# Patient Record
Sex: FEMALE | Race: WHITE
[De-identification: names, ages, dates, MRNs, and addresses within clinical notes are randomized per-mention and may not be internally consistent; named-entity substitution may affect disease eponyms.]

---

## 2019-04-02 ENCOUNTER — HOSPITAL ENCOUNTER (OUTPATIENT)
Dept: HOSPITAL 84 - D.HCCARDIO | Age: 84
Discharge: HOME | End: 2019-04-02
Attending: INTERNAL MEDICINE
Payer: MEDICARE

## 2019-04-02 VITALS — BODY MASS INDEX: 35.8 KG/M2

## 2019-04-02 DIAGNOSIS — I25.10: Primary | ICD-10-CM

## 2019-04-18 ENCOUNTER — HOSPITAL ENCOUNTER (OUTPATIENT)
Dept: HOSPITAL 84 - D.CATH | Age: 84
Discharge: HOME | End: 2019-04-18
Attending: INTERNAL MEDICINE
Payer: MEDICARE

## 2019-04-18 ENCOUNTER — HOSPITAL ENCOUNTER (OUTPATIENT)
Dept: HOSPITAL 84 - D.ER | Age: 84
Setting detail: OBSERVATION
LOS: 1 days | Discharge: HOME | End: 2019-04-19
Attending: INTERNAL MEDICINE | Admitting: INTERNAL MEDICINE
Payer: MEDICARE

## 2019-04-18 VITALS
HEIGHT: 63 IN | HEIGHT: 63 IN | BODY MASS INDEX: 35.79 KG/M2 | BODY MASS INDEX: 35.79 KG/M2 | BODY MASS INDEX: 35.79 KG/M2 | WEIGHT: 202 LBS | WEIGHT: 202 LBS

## 2019-04-18 VITALS
HEIGHT: 63 IN | WEIGHT: 202.42 LBS | BODY MASS INDEX: 35.87 KG/M2 | WEIGHT: 202.42 LBS | HEIGHT: 63 IN | BODY MASS INDEX: 35.87 KG/M2

## 2019-04-18 VITALS — DIASTOLIC BLOOD PRESSURE: 75 MMHG | SYSTOLIC BLOOD PRESSURE: 154 MMHG

## 2019-04-18 VITALS — SYSTOLIC BLOOD PRESSURE: 154 MMHG | DIASTOLIC BLOOD PRESSURE: 87 MMHG

## 2019-04-18 VITALS — SYSTOLIC BLOOD PRESSURE: 113 MMHG | DIASTOLIC BLOOD PRESSURE: 65 MMHG

## 2019-04-18 VITALS — DIASTOLIC BLOOD PRESSURE: 66 MMHG | SYSTOLIC BLOOD PRESSURE: 108 MMHG

## 2019-04-18 VITALS — DIASTOLIC BLOOD PRESSURE: 71 MMHG | SYSTOLIC BLOOD PRESSURE: 162 MMHG

## 2019-04-18 DIAGNOSIS — E78.5: ICD-10-CM

## 2019-04-18 DIAGNOSIS — Z95.1: ICD-10-CM

## 2019-04-18 DIAGNOSIS — I25.719: Primary | ICD-10-CM

## 2019-04-18 DIAGNOSIS — E11.9: ICD-10-CM

## 2019-04-18 DIAGNOSIS — I25.119: ICD-10-CM

## 2019-04-18 DIAGNOSIS — I10: ICD-10-CM

## 2019-04-18 DIAGNOSIS — I25.110: Primary | ICD-10-CM

## 2019-04-18 DIAGNOSIS — Z95.5: ICD-10-CM

## 2019-04-18 DIAGNOSIS — Z01.812: ICD-10-CM

## 2019-04-18 LAB
ALBUMIN SERPL-MCNC: 3.6 G/DL (ref 3.4–5)
ALP SERPL-CCNC: 95 U/L (ref 46–116)
ALT SERPL-CCNC: 27 U/L (ref 10–68)
ANION GAP SERPL CALC-SCNC: 17.6 MMOL/L (ref 8–16)
ANION GAP SERPL CALC-SCNC: 17.6 MMOL/L (ref 8–16)
APTT BLD: 23.1 SECONDS (ref 22.8–39.4)
BASOPHILS NFR BLD AUTO: 0 % (ref 0–2)
BASOPHILS NFR BLD AUTO: 0 % (ref 0–2)
BILIRUB SERPL-MCNC: 0.33 MG/DL (ref 0.2–1.3)
BUN SERPL-MCNC: 19 MG/DL (ref 7–18)
BUN SERPL-MCNC: 24 MG/DL (ref 7–18)
CALCIUM SERPL-MCNC: 8.9 MG/DL (ref 8.5–10.1)
CALCIUM SERPL-MCNC: 9.4 MG/DL (ref 8.5–10.1)
CHLORIDE SERPL-SCNC: 100 MMOL/L (ref 98–107)
CHLORIDE SERPL-SCNC: 100 MMOL/L (ref 98–107)
CK MB SERPL-MCNC: 1.9 U/L (ref 0–3.6)
CK SERPL-CCNC: 81 UL (ref 21–215)
CO2 SERPL-SCNC: 23.7 MMOL/L (ref 21–32)
CO2 SERPL-SCNC: 24.7 MMOL/L (ref 21–32)
CREAT SERPL-MCNC: 1.2 MG/DL (ref 0.6–1.3)
CREAT SERPL-MCNC: 1.4 MG/DL (ref 0.6–1.3)
EOSINOPHIL NFR BLD: 0 % (ref 0–7)
EOSINOPHIL NFR BLD: 0.3 % (ref 0–7)
ERYTHROCYTE [DISTWIDTH] IN BLOOD BY AUTOMATED COUNT: 12.9 % (ref 11.5–14.5)
ERYTHROCYTE [DISTWIDTH] IN BLOOD BY AUTOMATED COUNT: 13.2 % (ref 11.5–14.5)
GLOBULIN SER-MCNC: 3.9 G/L
GLUCOSE SERPL-MCNC: 429 MG/DL (ref 74–106)
GLUCOSE SERPL-MCNC: 611 MG/DL (ref 74–106)
HCT VFR BLD CALC: 36.5 % (ref 36–48)
HCT VFR BLD CALC: 38.4 % (ref 36–48)
HGB BLD-MCNC: 12.3 G/DL (ref 12–16)
HGB BLD-MCNC: 12.6 G/DL (ref 12–16)
IMM GRANULOCYTES NFR BLD: 0.5 % (ref 0–5)
IMM GRANULOCYTES NFR BLD: 0.7 % (ref 0–5)
INR PPP: 1.06 (ref 0.85–1.17)
LYMPHOCYTES NFR BLD AUTO: 13.1 % (ref 15–50)
LYMPHOCYTES NFR BLD AUTO: 22.6 % (ref 15–50)
MAGNESIUM SERPL-MCNC: 1.9 MG/DL (ref 1.8–2.4)
MCH RBC QN AUTO: 31.9 PG (ref 26–34)
MCH RBC QN AUTO: 32.2 PG (ref 26–34)
MCHC RBC AUTO-ENTMCNC: 32.8 G/DL (ref 31–37)
MCHC RBC AUTO-ENTMCNC: 33.7 G/DL (ref 31–37)
MCV RBC: 94.8 FL (ref 80–100)
MCV RBC: 98.2 FL (ref 80–100)
MONOCYTES NFR BLD: 2.7 % (ref 2–11)
MONOCYTES NFR BLD: 4.7 % (ref 2–11)
NEUTROPHILS NFR BLD AUTO: 73.9 % (ref 40–80)
NEUTROPHILS NFR BLD AUTO: 81.5 % (ref 40–80)
OSMOLALITY SERPL CALC.SUM OF ELEC: 295 MOSM/KG (ref 275–300)
OSMOLALITY SERPL CALC.SUM OF ELEC: 305 MOSM/KG (ref 275–300)
PLATELET # BLD: 222 10X3/UL (ref 130–400)
PLATELET # BLD: 223 10X3/UL (ref 130–400)
PMV BLD AUTO: 10.3 FL (ref 7.4–10.4)
PMV BLD AUTO: 10.4 FL (ref 7.4–10.4)
POTASSIUM SERPL-SCNC: 4.3 MMOL/L (ref 3.5–5.1)
POTASSIUM SERPL-SCNC: 4.3 MMOL/L (ref 3.5–5.1)
PROT SERPL-MCNC: 7.5 G/DL (ref 6.4–8.2)
PROTHROMBIN TIME: 13.3 SECONDS (ref 11.6–15)
RBC # BLD AUTO: 3.85 10X6/UL (ref 4–5.4)
RBC # BLD AUTO: 3.91 10X6/UL (ref 4–5.4)
SODIUM SERPL-SCNC: 137 MMOL/L (ref 136–145)
SODIUM SERPL-SCNC: 138 MMOL/L (ref 136–145)
TROPONIN I SERPL-MCNC: < 0.017 NG/ML (ref 0–0.06)
WBC # BLD AUTO: 6.3 10X3/UL (ref 4.8–10.8)
WBC # BLD AUTO: 9.1 10X3/UL (ref 4.8–10.8)

## 2019-04-18 NOTE — NUR
6 FR EXOSEAL R/GROIN IS CDI WITH CHEST PAIN DENIED REPOSITIONED TO
HOB UP 30 FOR COMFORT. SANDWICH AND SODA TO BEDSIDE

## 2019-04-18 NOTE — NUR
RESTING COMFORTABLY WITH NO C/O. RIGHT GROIN 6F EXOSEAL CDI, NO
BLEEDING OR HEMATOMA NOTED. NO NEEDS VOICED AT THIS TIME. VSS. CALL
LIGHT WITHIN REACH.

## 2019-04-18 NOTE — HEMODYNAMI
PATIENT:ELEAZAR CONNELLY                                 MEDICAL RECORD: [id number]
: 37                                            LOCATION:D.CAT          
ACCT# [id number]                                       ADMISSION DATE: 19
 
 
 Generatedon:20198:16
Patient name: ELEAZAR CONNELLY Patient #: [id number] Visit #: [id number] SSN: [id number]
4-2494 :
1935 Date of study: 2019
Page: Of
Hemodynamic Procedure Report
****************************
Patient Data
Patient Demographics
Procedure consent was obtained
First Name: ELEAZAR            Gender: Female
Last Name: MARICEL           : 1935
[facility] Initial: KRISHNA        Age: 83 year(s)
Patient #: [id number]       Race: 
Visit #: [id number]
SSN: 
Accession #:
[id number]
Additional ID: [id number]
Contact details
Address: [address] [place] [address]   Phone: [phone]
State: [place]
City: [facility]
Zip code: [address]
Past Medical History
Allergies
Allergen        Reaction        Date         Comments
Reported
Other allergy                   2016   augmentin, Contrast
Other allergy                   2018    IODINATED CONTRAST
DYE, AMOXICILIN,
LATEX
Contrast                        8/15/2018
Natural rubber                  8/15/2018
and latex
Natural rubber                  2019
and latex
Admission
Admission Data
Admission Date: 2019   Admission Time: 6:07
Admit Source: Other
Procedure
Procedure Types
Cath Procedure
PCI Procedure
AMI/SVG/ PTCA or Stent
SVG-BMS/WILLIAMS Initial
Procedure Description
Procedure Date
Procedure Date: 2019
Procedure Start Time: 7:51
Procedure End Time: 8:13
Procedure Staff
Name                            Function
 
Angelo Estevez MD                   Performing Physician
Aris Sarabia RN                  Nurse
Frantz Stahl RT                  Scrub
Arvin Goff RT                     Monitor
Fifi Vo RN                Circulator
Procedure Data
Cath Procedure
Fluoroscopy
Diagnostic fluoroscopy      Total fluoroscopy Time: 4.8
time: 4.8 min               min
Diagnostic fluoroscopy      Total fluoroscopy dose: 616
dose: 616 mGy               mGy
Contrast Material
Contrast Material Type                       Amount (ml)
Isovue 370                                   82
Entry Location
Entry     Primary  Successful  Side  Size  Upsize Upsize Entry    Closure Succes
sful  Closure
Location                             (Fr)  1 (Fr) 2 (Fr) Remarks  Device        
      Remarks
Femoral                        Right 6 Fr                         Exoseal
artery                               Short
Procedure Complications
No complications
Procedure Medications
Medication           Administration Route Dosage
0.9% NaCl            I.V.                 100 ml/hr
Oxygen               etCO2 Nasal cannula  2 l/min
Lidocaine 2%         added to field       20
Heparin Flush Bag    added to field       2 bags
(1000units/500ml NS)
Versed               I.V.                 2 mg
Fentanyl             I.V.                 50 mcg
Versed               I.V.                 1 mg
Fentanyl             I.V.                 25 mcg
Heparin Bolus        I.V.                 9000 units
Plavix               P.O.                 75 mg
Hemodynamics
Rest
Heart Rate: 81 (bpm)
Snapshots
Pre Cath      Intra         NCS           Post Cath
Vital Signs
Time    Heart  Resp   SPO2 etCO2   NIBP (mmHg) Rhythm  Pain    Sedation
Rate   (ipm)  (%)  (mmHg)                      Status  Level
(bpm)
7:38:57 91     16     97   26.7    119/77(98)  NSR     0 (11)  10(A)
, No
pain
7:43:17 85     19     96   29      121/72(105) NSR     0 (11)  10(A)
, No
pain
7:47:35 82     18     99   36.4    100/62(93)  NSR     0 (11)  10(A)
, No
pain
7:52:36 81     16     98   26.7    86/56(75)   NSR     0 (11)  10(A)
, No
pain
7:56:44 83     17     98   36.4    93/62(70)   NSR     0 (11)  9(A)
, No
 
pain
8:00:56 83     16     97   12.6    96/56(72)   NSR     0 (11)  9(A)
, No
pain
8:05:12 82     16     98   35.6    91/53(68)   NSR     0 (11)  9(A)
, No
pain
8:09:26 83     11     96   36.4    86/50(68)   NSR     0 (11)  9(A)
, No
pain
8:13:36 82     23     100  28.2    94/57(74)   NSR     0 (11)  10(A)
, No
pain
Medications
Time    Medication       Route   Dose  Verified Delivered Reason          Notes 
   Effectiveness
by       by
7:38:54 0.9% NaCl        I.V.    100   Angelo     Fifi     used for
ml/hr Herb Vo   procedure
RN
7:39:00 Oxygen           etCO2   2     Angelo     Fifi     used for
Nasal   l/min Herb Vo   procedure
cannula                RN
7:39:06 Lidocaine 2%     added   20ml  Angelo     Angelo      for local
to      vial  Herb Estevez MD  anesthetic
field
7:39:10 Heparin Flush    added   2     Angelo     Angelo      used for
Bag              to      bags  Herb Estevez MD  procedure
(1000units/500ml field
NS)
7:47:28 Versed           I.V.    2 mg  Angelo     Fifi     for sedation
Herb Vo
RN
7:47:39 Fentanyl         I.V.    50    Angelo     Fifi     for sedation
mcg   Herb Vo
RN
7:52:39 Versed           I.V.    1 mg  Angelo     Fifi     for sedation
Herb Vo
RN
7:52:45 Fentanyl         I.V.    25    Angelo     Fifi     for sedation
mcg   Herb Vo
RN
8:00:43 Heparin Bolus    I.V.    9000  Angelo     Fifi     for             verifi
ed
units Herb Vo   anticoagulation with Dr. COLLEEN Estevez
8:12:30 Plavix           P.O.    75 mg Angelo     Fifi     for
Herb Vo   antiplatelet
RN        therapy
Procedure Log
Time     Note
7:24:11  Informed consent obtained and on chart
7:24:14  Admit Source: Other
7:24:39  Diagnostic Cath status Elective
7:26:50  Arvin Suit RT(R) sent for patient. Start room use.
7:27:06  H&P Date Dictated: 2019 Within 30 days and on
chart..
7:32:54  Time tracking: Regular hours (M-F 7:00 - 5:00)
7:32:58  Plan of Care:Hemodynamics will remain stable., Cardiac
rhythm will remain stable., Comfort level will be
 
maintained., Respiratory function will remain
adequate., Patient/ family verbilizes understanding of
procedure., Procedure tolerated without complication.,
Recovers from procedure without complications..
7:33:02  Patient received from Pre/Post Procedure Room to CCL 1
Alert and oriented. Tansferred to table in Supine
position.
7:33:03  Warm blankets applied, and elliott hugger turned on for
patient comfort.
7:33:03  Correct patient and procedure confirmed by team.
7:33:04  ECG and BP/O2 sat monitors applied to patient.
7:33:07  Pre-procedure instructions explained to patient.
7:33:08  Pre-op teaching completed and patient verbalized
understanding.
7:33:09  Family in waiting room.
7:33:10  Patient NPO since Midnight.
7:37:45  Vital chart was started
7:38:54  0.9% NaCl 100 ml/hr I.V. was administered by Fifi Vo RN; used for procedure;
7:39:00  Oxygen 2 l/min etCO2 Nasal cannula was administered by
Fifi Vo RN; used for procedure;
7:39:06  Lidocaine 2% 20ml vial added to field was administered
by Angelo Estevez MD; for local anesthetic;
7:39:10  Heparin Flush Bag (1000units/500ml NS) 2 bags added to
field was administered by Angelo Estevez MD; used for
procedure;
7:41:29  Patient allergic to Natural rubber and latex
7:41:31  Is the patient allergic to Iodine/contrast media? No.
7:41:33  Is patient on blood thinner?Yes
7:41:38  **ACC** The patient was administered the following
blood thiners within the last 24 hours: **ACC**Plavix
7:41:40  Patient diabetic? Yes.
7:41:41  If diabetic: On Metformin? Yes
7:41:45  If on Metformin: Last Dose? 2019
7:41:49  ----Pre-sedation anethsthesia assessment.----
7:41:50  Previous problem with sedation/anesthesia? No ?
7:41:51  Snore? Yes
7:41:53  Sleep apnea? Yes
7:41:54  Deviated septum? No
7:41:56  Opens mouth fully? Yes
7:41:57  Sticks out tongue? Yes
7:43:46  Airway obstruction? Yes sesonal allergys
7:43:51  Dentures? Yes upper in tight
7:43:57  Pre procedure: right dorsailis pedis pulse 1+
Palpable, but thready & weak; easily obliterated
7:44:02  Patient pain scale 0/10 ?.
7:46:42  IV patent on arrival in left antecubital with 0.9%
NaCl at [facility].
7:46:47  Lab results completed and on chart.
7:46:50  Right groin area was prepped with chlora-prep and
draped in sterile fashion
7:46:51  Alarms reviewed by R. N.
7:46:51  Sharps counted by scrub and verified by R.N.
7:46:52  Physician arrived
7:46:53  --------ALL STOP TIME OUT------
7:46:53  Final Timeout: patient, procedure, and site verified
with staff and physician. All members of the team are
in agreement.
7:46:55  Right groin site verified by team.
7:47:02  Maximum allowable Isovue 370 dose 300ml. Physician
 
notified. (300ml for normal creatinines. For patients
with creatinine of 1.7 or higher multiply weight(kg) x
5 divided by creatinine.)
7:47:21  Fire Safety Assessment: A--An alcohol-based skin
anteseptic being used preoperatively., C--Open oxygen
or nitrous oxide is being used., D--An ESU, laser, or
fiber-optic light is being used.
7:47:26  Physical assessment completed. ASA score P 2 - A
patient with mild systemic disease as per Angelo Estevez MD.
7:47:28  Versed 2 mg I.V. was administered by Fifi Vo RN;
for sedation;
7:47:29  Sedation plan: IV Moderate Sedation Medication:Versed,
Fentanyl
7:47:39  Fentanyl 50 mcg I.V. was administered by Fifi Vo
RN; for sedation;
7:48:55  ACIST Syringe (31976) opened to sterile field.
7:48:55  Bag Decanter () opened to sterile field.
7:48:56  Medline Cath Pack ([id number]) opened to sterile field.
7:48:56  DIAGNOSTIC WIRE .035 260cm J wire (346602) opened to
sterile field.
7:48:58  ACIST Hand Control (30344) opened to sterile field.
7:48:58  ACIST Manifold (76839) opened to sterile field.
7:48:59  Tegaderm 4 x 4 (1626W) opened to sterile field.
7:50:15  TUBING High Pressure Extension Tubing (Herb)
([id number]) opened to sterile field.
7:50:17  INFLATOR Merit BasixCompak (LP4174) opened to sterile
field.
7:50:26  BMW 300cm Universal 2 J wire ([id number]) opened to
sterile field.
7:50:31  GUIDE 6FR AR 1.0 catheter ([id number]) opened to sterile
field.
7:50:37  EXOSEAL 6Fr () opened to sterile field.
7:50:38  SHEATH 6FR Troy (DSZ015) opened to sterile field.
7:51:48  Procedure started.
7:51:48  Full Disclosure recording started
7:51:58  Local anesthetic to right femoral artery with
Lidocaine 2% by Angelo Estevez MD.**INITIAL ACCESS ONLY**
7:52:05  A 6 Fr Short sheath was inserted into the Right
Femoral artery
7:52:39  Versed 1 mg I.V. was administered by Fifi Vo RN;
for sedation;
7:52:45  Fentanyl 25 mcg I.V. was administered by Fifi Vo
RN; for sedation;
7:53:27  Zero performed for pressure channel P1
7:54:14  6 Fr AR 1 guide catheter was inserted over the wire
7:54:21  Baseline sample Acquired.
7:54:24  Rhythm: sinus rhythm
7:56:58  Guide Catheter removed. unable to cannulate vessel.
7:57:37  GUIDE 6FR LCB catheter (LA6LCB) opened to sterile
field.
7:57:45  6 Fr LCB guide catheter was inserted over the wire
8:00:03  Guide Catheter removed. unable to cannulate vessel.
8:00:13  GUIDE 6FR 3DRC catheter ([id number]) opened to sterile
field.
8:00:43  Heparin Bolus 9000 units I.V. was administered by
Fifi Vo RN; for anticoagulation; verified with
Dr. Estevez
8:00:57  BMW2 wire advanced.
8:10:01  Place stent Inflation Number: 1 A SEGUNDO OTW 3.5 x 34
 
stent ([id number]) was prepped and advanced across
the Aorta Left -> Mid CX. The stent was deployed at 13
MALACHI for 0:36 (min:sec).
8:10:33  Stent catheter was removed intact over wire.
8:10:34  Wire removed.
8:10:35  Guide catheter removed.
8:10:51  EXOSEAL 6Fr () opened to sterile field.
8:11:02  Sheath removed intact; hemostasis achieved with
Exoseal to the Right Femoral artery.
8:11:05  Procedure ended.(Physican Out)
8:11:21  Fluoroscopy time 04.80 minutes.
8:11:27  Flurop Dose total: 616
8:11:27  Fluoroscopy dose: 616 mGy
8:11:32  Contrast amount:Isovue 370 82ml.
8:11:34  Sharps counted by scrub and verified by R.N.
8:11:35  Insertion/operative site no bleeding no hematoma.
8:11:38  Post-op/insertion site Right Femoral artery dressed
using a 4 x 4 and Tegaderm.
8:11:42  Post right femoral artery:stable
8:11:51  Post Procedure Pulses reassessed and unchanged
8:11:54  Post procedure: right dorsailis pedis pulse 2+ Normal;
easily identifiable; not easily obliterated.
8:12:30  Plavix 75 mg P.O. was administered by Fifi Vo RN; for antiplatelet therapy;
8:12:32  Post procedure rhythm: sinus rhythm
8:12:34  Post procedure instruction explained to
patient.Patient verbalizes understanding.
8:12:35  Procedure and supply charges have been captured,
reviewed, submitted and are correct.
8:12:58  Procedure Complication : No complications
8:13:15  Vital chart was stopped
8:13:15  See physician's report for complete and final results.
8:13:17  Report given to Pre/Post Procedure Room.
8:13:21  Patient transfered to Pre/Post Procedure Room with
Stretcher.
8:13:24  Procedure ended.
8:13:24  Full Disclosure recording stopped
8:13:27  End room use (Document Last)
Intervention Summary
Intervention Notes
Time    ActionType  Lesion and  Equipment     Action#  Pressure  Duration
Attributes  Used
8:10:01 Place stent Aorta Left  SEGUNDO OTW 3.5  1        13        00:36
-> Mid CX   x 34 stent
([id number])
Device Usage
Item Name     Manufacture  Quantity  Catalog      Hospital Part     Current Mini
mal Lot# /
Number       Charge   Number   Stock   Stock   Serial#
Code
ACIST Syringe Acist        1         31838        029339   256858   258632  20
(49771)       Medical
Systems Inc
Bag Decanter  Microtek     1                 979934   21662    508060  5
()       Medical Inc.
Medline Cath  Medline      1         [id number]    699537   27322    902735  5
Pack
([id number])
DIAGNOSTIC    [place]      1         664542       553989   334385   736857  30
WIRE .035
 
260cm J wire
(553253)
ACIST Hand    Acist        1         74689        448770   411208   952862  5
Control       Medical
(53176)       Systems Inc
ACIST         Acist        1         74847        774119   825802   873444  5
Manifold      Medical
(39688)       Systems Inc
Tegaderm 4 x  3M           1         1626W        596473   200219   470301  5
4 (1626W)
TUBING High   Merit        1         [id number]      288821   17596    265149  10
Pressure      Medical
Extension
Tubing
(Estevez)
([id number])
INFLATOR      Merit        1         ON3343       953082   344957   944225  15
Merit         Medical
BasixCompak
(VV0338)
BMW 300cm     Abbott       1         [id number]     234172   764723   620449  5
[place] 2 J Vascular
wire
([id number])
GUIDE 6FR AR  Medtronic    1         SS7NA30      210235   05679    096521  1
1.0 catheter
(CT0PS45)
EXOSEAL 6Fr   Cardinal     2                 465914   291933   957100  10
()       Health
SHEATH 6FR    Terumo       1         AAM479       587391   654058   453629  40
Troy
(FQR991)
GUIDE 6FR LCB Medtronic    1         LA6LCB       963689   98562    107829  1
catheter
(LA6LCB)
GUIDE 6FR     Medtronic    1         TT87INE      294385   877036   913477  1
3DRC catheter
(PT59IGD)
SEGUNDO OTW 3.5  Medtronic    1         [id number]  758192   4321107  432285  5   
    [id number]
x 34 stent
([id number])
Signature Audit [place]
Stage           Time        Signature      Unsigned
Intra-Procedure 2019   Arvin Goff RT(SIMA)
8:16:07 AM
Signatures
Monitor : Arvin Goff RT    Signature :
______________________________
Date : ______________ Time :
______________
 
 
 
 
[facility]                                          
[address] [address]

## 2019-04-18 NOTE — NUR
6 FR EXOSEAL R/GROIN REMAINS CDI WITH NO BLEEDING OR HEMATOMA SITE IS
SOFT TO TOUCH WITH R/FOOT WARM

## 2019-04-18 NOTE — NUR
PATIENT CONTINUES TO REST WITH EYES CLOSED. RESPIRATONS ARE EVEN AND
UNLABORED. 6 FR EXOSEAL R/GROIN IS CDI NO BLEEDING NOTED

## 2019-04-18 NOTE — NUR
RECIEVED TO ROOM VIA STRETCHER FROM CATH LAB WITH 6 FR EXOSEAL
R/GROIN CDI NO BLEEDING OR HEMATOMA NOTED. PATIENT CONNECTED TO
MONITOR FOR OBSERVATION WITH HR 83 /67. CHEST PAIN IS DENIED

## 2019-04-18 NOTE — NUR
VERBAL AND WRITTEN DISCHARGE GONE OVER WITH PATIENT. 6 FR EXOSEAL
R/GROIN IS CDI NO BLEEDING OR HEMATOMA. PIV REMOVED WITH DRESSING
APPLIED. PATIENT UP TO GET DRESSED FOR DISCHARGE HOME

## 2019-04-19 VITALS — SYSTOLIC BLOOD PRESSURE: 108 MMHG | DIASTOLIC BLOOD PRESSURE: 65 MMHG

## 2019-04-19 VITALS — DIASTOLIC BLOOD PRESSURE: 72 MMHG | SYSTOLIC BLOOD PRESSURE: 132 MMHG

## 2019-04-19 VITALS — DIASTOLIC BLOOD PRESSURE: 77 MMHG | SYSTOLIC BLOOD PRESSURE: 121 MMHG

## 2019-04-19 LAB
CK MB SERPL-MCNC: 2.2 U/L (ref 0–3.6)
CK MB SERPL-MCNC: 2.7 U/L (ref 0–3.6)
CK SERPL-CCNC: 74 UL (ref 21–215)
CK SERPL-CCNC: 91 UL (ref 21–215)
TROPONIN I SERPL-MCNC: 0.28 NG/ML (ref 0–0.06)
TROPONIN I SERPL-MCNC: 0.3 NG/ML (ref 0–0.06)

## 2020-02-25 ENCOUNTER — HOSPITAL ENCOUNTER (OUTPATIENT)
Dept: HOSPITAL 84 - D.CATH | Age: 85
Discharge: HOME | End: 2020-02-25
Attending: INTERNAL MEDICINE
Payer: MEDICARE

## 2020-02-25 ENCOUNTER — HOSPITAL ENCOUNTER (INPATIENT)
Dept: HOSPITAL 84 - D.ER | Age: 85
LOS: 1 days | Discharge: HOME | DRG: 303 | End: 2020-02-26
Attending: INTERNAL MEDICINE | Admitting: INTERNAL MEDICINE
Payer: MEDICARE

## 2020-02-25 VITALS — BODY MASS INDEX: 36.04 KG/M2 | BODY MASS INDEX: 36.04 KG/M2 | HEIGHT: 63 IN | WEIGHT: 203.43 LBS

## 2020-02-25 VITALS — DIASTOLIC BLOOD PRESSURE: 65 MMHG | SYSTOLIC BLOOD PRESSURE: 151 MMHG

## 2020-02-25 VITALS — DIASTOLIC BLOOD PRESSURE: 64 MMHG | SYSTOLIC BLOOD PRESSURE: 134 MMHG

## 2020-02-25 VITALS — WEIGHT: 209.04 LBS | HEIGHT: 63 IN | BODY MASS INDEX: 37.04 KG/M2

## 2020-02-25 VITALS — DIASTOLIC BLOOD PRESSURE: 72 MMHG | SYSTOLIC BLOOD PRESSURE: 142 MMHG

## 2020-02-25 DIAGNOSIS — K21.9: ICD-10-CM

## 2020-02-25 DIAGNOSIS — N17.9: ICD-10-CM

## 2020-02-25 DIAGNOSIS — I10: ICD-10-CM

## 2020-02-25 DIAGNOSIS — E78.5: ICD-10-CM

## 2020-02-25 DIAGNOSIS — R53.83: ICD-10-CM

## 2020-02-25 DIAGNOSIS — R94.30: ICD-10-CM

## 2020-02-25 DIAGNOSIS — R06.09: ICD-10-CM

## 2020-02-25 DIAGNOSIS — I25.119: Primary | ICD-10-CM

## 2020-02-25 DIAGNOSIS — E11.9: ICD-10-CM

## 2020-02-25 DIAGNOSIS — E11.40: ICD-10-CM

## 2020-02-25 DIAGNOSIS — Z79.84: ICD-10-CM

## 2020-02-25 LAB
ALBUMIN SERPL-MCNC: 3.4 G/DL (ref 3.4–5)
ALP SERPL-CCNC: 81 U/L (ref 30–120)
ALT SERPL-CCNC: 23 U/L (ref 10–68)
ALT SERPL-CCNC: 27 U/L (ref 10–68)
ANION GAP SERPL CALC-SCNC: 16 MMOL/L (ref 8–16)
ANION GAP SERPL CALC-SCNC: 16.5 MMOL/L (ref 8–16)
BASOPHILS NFR BLD AUTO: 0.1 % (ref 0–2)
BASOPHILS NFR BLD AUTO: 0.1 % (ref 0–2)
BILIRUB SERPL-MCNC: 0.4 MG/DL (ref 0.2–1.3)
BILIRUB SERPL-MCNC: NEGATIVE MG/DL
BUN SERPL-MCNC: 26 MG/DL (ref 7–18)
BUN SERPL-MCNC: 28 MG/DL (ref 7–18)
CALCIUM SERPL-MCNC: 9.1 MG/DL (ref 8.5–10.1)
CALCIUM SERPL-MCNC: 9.3 MG/DL (ref 8.5–10.1)
CHLORIDE SERPL-SCNC: 100 MMOL/L (ref 98–107)
CHLORIDE SERPL-SCNC: 100 MMOL/L (ref 98–107)
CHOLEST/HDLC SERPL: 2.3 RATIO (ref 2.3–4.1)
CK MB SERPL-MCNC: 1.5 U/L (ref 0–3.6)
CK SERPL-CCNC: 103 UL (ref 21–215)
CO2 SERPL-SCNC: 23.9 MMOL/L (ref 21–32)
CO2 SERPL-SCNC: 28.7 MMOL/L (ref 21–32)
CREAT SERPL-MCNC: 1.4 MG/DL (ref 0.6–1.3)
CREAT SERPL-MCNC: 1.6 MG/DL (ref 0.6–1.3)
EOSINOPHIL NFR BLD: 0 % (ref 0–7)
EOSINOPHIL NFR BLD: 0.1 % (ref 0–7)
ERYTHROCYTE [DISTWIDTH] IN BLOOD BY AUTOMATED COUNT: 13.1 % (ref 11.5–14.5)
ERYTHROCYTE [DISTWIDTH] IN BLOOD BY AUTOMATED COUNT: 13.2 % (ref 11.5–14.5)
GLOBULIN SER-MCNC: 3.9 G/L
GLUCOSE SERPL-MCNC: 1000 MG/DL
GLUCOSE SERPL-MCNC: 389 MG/DL (ref 74–106)
GLUCOSE SERPL-MCNC: 599 MG/DL (ref 74–106)
HCT VFR BLD CALC: 36.1 % (ref 36–48)
HCT VFR BLD CALC: 37.5 % (ref 36–48)
HDLC SERPL-MCNC: 76 MG/DL (ref 32–96)
HGB BLD-MCNC: 11.9 G/DL (ref 12–16)
HGB BLD-MCNC: 12.1 G/DL (ref 12–16)
IMM GRANULOCYTES NFR BLD: 0.5 % (ref 0–5)
IMM GRANULOCYTES NFR BLD: 0.7 % (ref 0–5)
KETONES UR STRIP-MCNC: NEGATIVE MG/DL
LDL-HDL RATIO: 1.2 RATIO (ref 1.5–3.5)
LDLC SERPL-MCNC: 91 MG/DL (ref 0–100)
LYMPHOCYTES NFR BLD AUTO: 10.1 % (ref 15–50)
LYMPHOCYTES NFR BLD AUTO: 13.1 % (ref 15–50)
MAGNESIUM SERPL-MCNC: 1.6 MG/DL (ref 1.8–2.4)
MCH RBC QN AUTO: 31.5 PG (ref 26–34)
MCH RBC QN AUTO: 31.6 PG (ref 26–34)
MCHC RBC AUTO-ENTMCNC: 32.3 G/DL (ref 31–37)
MCHC RBC AUTO-ENTMCNC: 33 G/DL (ref 31–37)
MCV RBC: 95.5 FL (ref 80–100)
MCV RBC: 97.9 FL (ref 80–100)
MONOCYTES NFR BLD: 2.1 % (ref 2–11)
MONOCYTES NFR BLD: 3.1 % (ref 2–11)
NEUTROPHILS NFR BLD AUTO: 84.1 % (ref 40–80)
NEUTROPHILS NFR BLD AUTO: 86 % (ref 40–80)
NITRITE UR-MCNC: NEGATIVE MG/ML
OSMOLALITY SERPL CALC.SUM OF ELEC: 299 MOSM/KG (ref 275–300)
OSMOLALITY SERPL CALC.SUM OF ELEC: 305 MOSM/KG (ref 275–300)
PH UR STRIP: 5 [PH] (ref 5–6)
PLATELET # BLD: 204 10X3/UL (ref 130–400)
PLATELET # BLD: 218 10X3/UL (ref 130–400)
PMV BLD AUTO: 10.5 FL (ref 7.4–10.4)
PMV BLD AUTO: 10.6 FL (ref 7.4–10.4)
POTASSIUM SERPL-SCNC: 4.4 MMOL/L (ref 3.5–5.1)
POTASSIUM SERPL-SCNC: 4.7 MMOL/L (ref 3.5–5.1)
PROT SERPL-MCNC: 7.3 G/DL (ref 6.4–8.2)
RBC # BLD AUTO: 3.78 10X6/UL (ref 4–5.4)
RBC # BLD AUTO: 3.83 10X6/UL (ref 4–5.4)
SODIUM SERPL-SCNC: 136 MMOL/L (ref 136–145)
SODIUM SERPL-SCNC: 140 MMOL/L (ref 136–145)
SP GR UR STRIP: 1.01 (ref 1–1.02)
TRIGL SERPL-MCNC: 58 MG/DL (ref 30–200)
TROPONIN I SERPL-MCNC: < 0.017 NG/ML (ref 0–0.06)
UROBILINOGEN UR-MCNC: NORMAL MG/DL
WBC # BLD AUTO: 8 10X3/UL (ref 4.8–10.8)
WBC # BLD AUTO: 9.5 10X3/UL (ref 4.8–10.8)

## 2020-02-25 NOTE — NUR
REC TO ROOM 6 VIA STRETCHER FROM CATH LAB. MONITORING INITIATED. DTR
AT BEDSIDE. R GROIN DRESSING TEGADERM, 4X4 CDI. NO S/S BLEEDING OR
HEMATOMA. PPP. WARM BLANKETS PROVIDED. HR NSR 90, /63, SAT 65%
RA

## 2020-02-25 NOTE — NUR
R GROIN CDI, SOFT. NO S/S BLEEDING OR HEMATOMA. RAISED HOB TO PT
COMFORT, PROVIDED SANDWICH AND SODA. NSR 99, /60

## 2020-02-25 NOTE — NUR
DC INSTRUCTIONS REVIEWED. PT TO RESTROOM VIA WHEELCHAIR, VOIDED
WITHOUT DIFFICULTY. DC HOME VIA WHEELCHAIR TO PRIVATE CAR WITH DTR,
PT HAS ALL BELONGINGS.

## 2020-02-25 NOTE — NUR
R GROIN SOFT, NO S/S BLEEDING OR HEMATOMA. PT LIA BURGER AND FRIES
FROM SONIC DTR BROUGHT IN. OFF BEDPAN, VOIDED 300ML MED YELLOW
URINE. ASSISTED TO SITTING ON BEDSIDE POSITION PER PT REQUEST, WANTS
TO WAIT A BIT BEFORE HAVING IV OUT AND DRESSING.

## 2020-02-25 NOTE — NUR
R GROIN SOFT, NO S/S BLEEDING OR HEMATOMA. PPP. TOLERATING SIPS OF
WATER WITH ASSISTANCE. REMINDED TO KEEP HEAD ON PILLOW AND RLE STILL
AND STRAIGHT. DTR AT BEDSIDE.

## 2020-02-25 NOTE — NUR
PT RESTING IN SUPINE POSITION, DAUGHTER LEFT TO GET PT'S C-PAP MACHINE FOR
STAY. PT PROVIDED WITH PILLOW AND BLANKET PER REQUEST. NO SIGNS DISTRESS NOTED.
WILL CONTINUE TO MONITOR.

## 2020-02-25 NOTE — HEMODYNAMI
PATIENT:ELEAZAR CONNELLY                                 MEDICAL RECORD: [id number]
: 37                                            LOCATION:D.CAT          
ACCT# [id number]                                       ADMISSION DATE: 20
 
 
 Generatedon:202013:39
Patient name: ELEAZAR CONNELLY Patient #: [id number] Visit #: [id number] SSN: [id number]
[id number] :
1935 Date of study: 2020
Page: Of
Hemodynamic Procedure Report
****************************
Patient Data
Patient Demographics
Procedure consent was obtained
First Name: ELEAZAR            Gender: Female
Last Name: MARICEL           : 1935
Middle Initial: KRISHNA        Age: 84 year(s)
Patient #: [id number]       Race: 
Visit #: [id number]
SSN: 
Accession #:
[id number]
Additional ID: [id number]
Contact details
Address: [address] [place] [address]   Phone: [phone]
State: [place]
City: [facility]
Zip code: [address]
Past Medical History
Allergies
Allergen        Reaction        Date         Comments
Reported
Other allergy                   2016   augmentin, Contrast
Other allergy                   2018    IODINATED CONTRAST
DYE, AMOXICILIN,
LATEX
Contrast                        8/15/2018
Natural rubber                  8/15/2018
and latex
Natural rubber                  2019
and latex
Other allergy                   2020    IV CONTRAST, LATEX,
BYESTTA, AUGMENTIN,
POWDER
Admission
Admission Data
Admission Date: 2020   Admission Time: 10:06
Arrival Date: 2020     Arrival Time: 0:00
Admit Source: Other         Insurance Payor: Medicare
[facility] #: [id number]
Height (in.): 62.99         BSA: 1.97 (m2)
Height (cm.): 160           BMI: 37.11 (kg/m2)
Weight (lbs.): 209.44
Weight (kg.): 95
Lab Results
Lab Result Date: 2020  Lab Result Time: 0:00
Biochemistry
Name         Units    Result                Min      Max
 
BUN          mg/dl    26       --(----)-*   7        18
Creatinine   mg/dl    1.4      --(----)*-   0.6      1.3
eGFR         ml/min   38       *-(----)--   90       120
NONAFRICAN
CBC
Name         Units    Result                Min      Max
Hematocrit   %        36.1     *-(----)--   42       54
Hemoglobin   g/dl     11.9     *-(----)--   13.5     17.5
Procedure
Procedure Types
Cath Procedure
Diagnostic Procedure
LHC
LHC w/Coronaries w/Grafts
Sedation Charges
Moderate Sedation up to 30 minutes
Procedure Description
Procedure Date
Procedure Date: 2020
Procedure Start Time: 12:55
Procedure End Time: 13:37
Procedure Staff
Name                            Function
Angelo Estevez MD                   Performing Physician
Aris Sarabia RN                  Nurse
Jasmyn Hdez RT               Monitor
Damari Blanco RT              Scrub
Fifi Vo RN                Monitor
Procedure Data
Cath Procedure
Fluoroscopy
Diagnostic fluoroscopy      Total fluoroscopy Time: 8.1
time: 8.1 min               min
Diagnostic fluoroscopy      Total fluoroscopy dose:
dose: 502.19 mGy            502.19 mGy
Contrast Material
Contrast Material Type                       Amount (ml)
Isovue 300                                   76
Entry Location
Entry     Primary  Successful  Side  Size  Upsize Upsize Entry    Closure     Ng
ccessful  Closure
Location                             (Fr)  1 (Fr) 2 (Fr) Remarks  Device        
          Remarks
Femoral                        Right 5 Fr                         Manual
vein                                                              Compression
Femoral                        Right 5 Fr                         Exoseal
artery
Estimated blood loss: 5 ml
Diagnostic catheters
Device Type               Used For           End Catheter
Placement
MULTIPACK JL 4.0 5Fr      Procedure
catheter
DIAGNOSTIC RCB 5Fr        Procedure
catheter (243303F)
DIAGNOSTIC AR MOD 5Fr     Procedure
Catheter (679956E)
DIAGNOSTIC IM 5Fr         Procedure
catheter (607220A)
MULTIPACK Pigtail 5 Fr    Procedure
 
catheter
Procedure Complications
No complications
Procedure Medications
Medication           Administration Route Dosage
Oxygen               NC                   2 l/min
Lidocaine 2%         added to field       20
Heparin Flush Bag    added to field       2 bags
(1000units/500ml NS)
0.9% NaCl            I.V.                 100 ml/hr
Versed               I.V.                 1 mg
Fentanyl             I.V.                 50 mcg
Versed               I.V.                 1 mg
Fentanyl             I.V.                 50 mcg
Versed               I.V.                 0.5 mg
Hemodynamics
Rest
BSA: 1.97 (m2) HGB: 11.9 (g/dl) O2 Consumption: Estimated: 190.14 (ml/min) O2 Co
nsumption indexed:
Estimated:96.52 (ml/min/m) Heart Rate: 89 (bpm)
Pressure Samples
Time     Site     Value (mmHg) Purpose      Heart      Use
Rate(bpm)
13:24    LV       126/4,13     Snapshot     89
13:25    AO       121/51(81)   Pullback     89
13:25    LV       126/2,12     Pullback     89
Gradients
Valve  Time  Site 1   Site 2     Mean    SEP/DFP    Peak To Heart  Use
(mmHg)  (sec/min)  Peak    Rate
(mmHg)  (bpm)
Aortic 13:25 LV       AO         9       26         5       89
126/2,12 121/51(81)
Calculations
Valve    P-P      Mean      Valve     Index  Valve    Source
Name              Gradient  Area             Flow
(cm2)
Aortic   5        9
5        9
Snapshots
Pre Cath      Intra         NCS           Post Cath
Vital Signs
Time     Heart  Resp   SPO2 etCO2   NIBP (mmHg) Rhythm  Pain      Sedation
Rate   (ipm)  (%)  (mmHg)                      Status    Level
(bpm)
12:39:23 89     25     100  35.1    138/79(113) NSR     (Missing) 10(A)
12:43:47 88     10     97   32.8    131/69(108) NSR     (Missing) 10(A)
12:48:11 88     18     97   23.1    130/67(96)  NSR     (Missing) 10(A)
12:52:32 87     11     97   32.1    128/68(98)  NSR     (Missing) 10(A)
12:56:54 86     15     98   37.3    125/65(96)  NSR     (Missing) 9(A)
13:01:10 87     19     97   38.1    125/67(84)  NSR     (Missing) 9(A)
13:05:26 84     22     98   27.6    125/67(98)  NSR     (Missing) 9(A)
13:09:46 85     21     97   37.3    122/66(90)  NSR     (Missing) 9(A)
13:14:06 88     17     97   37.3    117/64(96)  NSR     (Missing) 9(A)
13:18:22 90     21     97   17.1    120/63(91)  NSR     (Missing) 9(A)
13:22:40 88     15     98   35.1    120/60(86)  NSR     (Missing) 9(A)
13:26:56 88     17     97   35.8    117/62(89)  NSR     (Missing) 10(A)
13:31:14 87     13     97   35.8    123/63(89)  NSR     (Missing) 10(A)
13:35:32 87     11     97   37.3    121/63(87)  NSR     (Missing) 10(A)
Medications
Time     Medication       Route  Dose  Verified Delivered Reason     Notes  Effe
 
ctiveness
by       by
12:48:09 Oxygen           NC     2     Angelo     Buffie    used for
l/min Herb Sarabia RN   procedure
12:48:18 Lidocaine 2%     added  20ml  Angelo     Angelo      for local
to     vial  Herb Estevez MD  anesthetic
field
12:48:24 Heparin Flush    added  2     Angelo     Angelo      used for
Bag              to     bags  Herb Estevez MD  procedure
(1000units/500ml field
NS)
12:48:34 0.9% NaCl        I.V.   100   Angelo     Buffie    Per
ml/hr Herb Sarabia RN   physician
12:53:59 Versed           I.V.   1 mg  Angelo     Buffie    for
Herb Sarabia RN   sedation
12:54:04 Fentanyl         I.V.   50    Angelo     Buffie    for
mcg   Herb Sarabia RN   sedation
12:57:13 Versed           I.V.   1 mg  Angelo     Buffie    for
Herb Sarabia RN   sedation
12:57:17 Fentanyl         I.V.   50    Angelo     Buffie    for
mcg   Herb Sarabia RN   sedation
13:12:04 Versed           I.V.   0.5   Angelo     Buffie    for
mg    Herb Sarabia RN   sedation
Procedure Log
Time     Note
12:22:57 Informed consent obtained and on chart
12:25:04 Aris Sarabia RN sent for patient. Start room use.
12:28:29 Lab Result : eGFR NONAFRICAN 38 ml/min
12:28:29 Lab Result : Creatinine 1.4 mg/dl
12:28:29 Lab Result : Hematocrit 36.1 %
12:28:29 Lab Result : BUN 26 mg/dl
12:28:29 Lab Result : Hemoglobin 11.9 g/dl
12:28:53 Arrival Date: 2020 12:00:00 AM
12:29:22 Insurance Payor : Medicare
12:29:33 Patient Height : 62.99 inches
12:29:40 Patient Weight : 209.44 lbs
12:29:49 Admit Source: Other
12:31:00 Procedure Status Elective Heart Cath (OP).
12:31:07 Time tracking: Regular hours (M-F 7:00 - 5:00)
12:31:13 Plan of Care:Hemodynamics will remain stable., Cardiac
rhythm will remain stable., Comfort level will be
maintained., Respiratory function will remain
adequate., Patient/ family verbilizes understanding of
procedure., Procedure tolerated without complication.,
Recovers from procedure without complications..
12:31:25 Patient received from Pre/Post Procedure Room to CCL 3
Alert and oriented. Tansferred to table in Supine
position.
12:37:48 Warm blankets applied, and elliott hugger turned on for
patient comfort.
12:37:48 Correct patient and procedure confirmed by team.
12:37:48 ECG and BP/O2 sat monitors applied to patient.
12:38:04 Vital chart was started
12:38:10 Rhythm: sinus rhythm
12:38:11 Full Disclosure recording started
12:38:17 H&P Date Dictated: 2/3/2020 Within 30 days and on
chart., H&P Addendum completed by physician on day of
procedure. (MUST COMPLETE FOR ALL OUTPATIENTS).
12:38:18 Pre-procedure instructions explained to patient.
12:38:18 Pre-op teaching completed and patient verbalized
 
understanding.
12:38:19 Family in patients room.
12:38:20 Patient NPO since Midnight.
12:38:46 Patient allergic to Other allergyIV CONTRAST, LATEX,
BYESTTA, AUGMENTIN, POWDER
12:38:50 Is the patient allergic to Iodine/contrast media? Yes.
12:38:54 Was the patient premedicated? Yes
12:39:02 Is patient on blood thinner?Yes
12:39:04 **ACC** The patient was administered the following
blood thiners within the last 24 hours: **ACC**Plavix
12:39:05 Patient diabetic? Yes.
12:39:08 If diabetic: On Metformin? Yes
12:39:10 If on Metformin: Last Dose? 2020
12:39:14 Patient not pregnant. Patient is over age 55.
12:39:17 Previous problem with sedation/anesthesia? No ?
12:39:17 Snore? Yes
12:39:18 Sleep apnea? Yes
12:39:19 Deviated septum? No
12:39:20 Opens mouth fully? Yes
12:39:21 Sticks out tongue? Yes
12:39:25 Airway obstruction? Yes ASTHMA
12:39:28 Dentures? No ?
12:39:30 Pre procedure: right dorsailis pedis pulse 1+
Palpable, but thready & weak; easily obliterated
12:39:33 Patient pain scale 0/10 ?.
12:39:37 IV patent on arrival in right antecubital with 0.9%
NaCl at [facility].
12:39:39 Lab results completed and on chart.
12:39:58 Stress Test: yes; abnormal INFERIOR
12:40:01 Right groin area was prepped with chlora-prep and
draped in sterile fashion
12:40:02 Alarms reviewed by R. N.
12:40:02 Sharps counted by scrub and verified by R.N.
12:43:11 Procedure type changed to Cath procedure, Diagnostic
procedure, LHC, LHC w/Coronaries w/Grafts, Sedation
Charges, Moderate Sedation up to 30 minutes
12:45:29 Risk of Mortality: 1.1
12:45:32 Risk of blood transfusion: 3.6
12:45:38 Risk of YARON: 5.1
12:45:43 Use device set Femoral Dx
12:45:44 ACIST Syringe (25658) opened to sterile field.
12:45:44 Bag Decanter (2002S) opened to sterile field.
12:45:45 ACIST Hand Control (72373) opened to sterile field.
12:45:45 ACIST Manifold (18563) opened to sterile field.
12:45:46 Tegaderm 4 x 4 (1626W) opened to sterile field.
12:45:47 Medline Cath Pack ([id number]) opened to sterile field.
12:45:48 DIAGNOSTIC Multipack 5Fr catheter set (OC2937) opened
to sterile field.
12:45:49 SHEATH 5FR [place] (XLU562) opened to sterile field.
12:45:50 EMERALD Guide Wire (540-227) opened to sterile field.
12:48:09 Oxygen 2 l/min NC was administered by Aris Sarabia RN;
used for procedure; Verbal order read back and
verified.
12:48:18 Lidocaine 2% 20ml vial added to field was administered
by Angelo Estevez MD; for local anesthetic; Verbal order
read back and verified.
12:48:24 Heparin Flush Bag (1000units/500ml NS) 2 bags added to
field was administered by Angelo Estevez MD; used for
procedure; Verbal order read back and verified.
12:48:34 0.9% NaCl 100 ml/hr I.V. was administered by Buffie
 
Sarabia RN; Per physician; Verbal order read back and
verified.
12:51:40 --------ALL STOP TIME OUT------
12:51:41 Final Timeout: patient, procedure, and site verified
with staff and physician. All members of the team are
in agreement.
12:51:45 Right groin site verified by team.
12:51:49 Fire Safety Assessment: A--An alcohol-based skin
anteseptic being used preoperatively., C--Open oxygen
or nitrous oxide is being used., D--An ESU, laser, or
fiber-optic light is being used.
12:51:54 Physical assessment completed. ASA score P 2 - A
patient with mild systemic disease as per Angelo Estevez MD.
12:52:02 3b) 30-44 Moderately reduced kidney function.
12:52:10 Maximum allowable contrast dose (3.7 X eGFR X 0.75)105
ml.
12:52:14 Sedation plan: IV Moderate Sedation Medication:Versed,
Fentanyl
12:53:59 Versed 1 mg I.V. was administered by Aris Sarabia RN;
for sedation; Verbal order read back and verified.
12:54:04 Fentanyl 50 mcg I.V. was administered by Aris Sarabia
RN; for sedation; Verbal order read back and verified.
12:54:31 Procedure started.
12:55:01 Local anesthetic to right femoral artery with
Lidocaine 2% by Angelo Estevez MD.**INITIAL ACCESS ONLY**
12:57:13 Versed 1 mg I.V. was administered by Aris Sarabia RN;
for sedation; Verbal order read back and verified.
12:57:17 Fentanyl 50 mcg I.V. was administered by Aris Sarabia
RN; for sedation; Verbal order read back and verified.
12:57:59 A 5 Fr sheath was inserted into the Right Femoral vein
12:58:38 SHEATH 5FR [place] (STH735) opened to sterile field.
13:01:48 UNABLE TO ACCESS RIGHT FEMORAL ARTERY, PREPPING LEFT
FEMORAL ARTERY
13:02:06 Left groin area was prepped with chlora-prep and
draped in sterile fashion
13:06:20 ATTEMPTING RIGHT GROIN SHEATH AGAIN.
13:10:38 A 5 Fr sheath was inserted into the Right Femoral
artery
13:12:04 Versed 0.5 mg I.V. was administered by Aris Sarabia RN;
for sedation; Verbal order read back and verified.
13:12:24 A MULTIPACK JL 4.0 5Fr catheter was advanced over the
wire and used for Procedure.
13:12:27 LCA angiography performed.
13:13:57 Catheter removed.
13:14:23 A DIAGNOSTIC RCB 5Fr catheter ([id number]) was advanced
over the wire and used for Procedure.
13:15:14 SVG to Circ angiography performed.
13:16:58 Catheter removed.
13:18:38 A DIAGNOSTIC AR MOD 5Fr Catheter ([id number]) was
advanced over the wire and used for Procedure.
13:18:50 SVG to RCA angiography performed.
13:18:53 SVG to RCA occluded.
13:19:19 Catheter removed.
13:21:14 A DIAGNOSTIC IM 5Fr catheter ([id number]) was advanced
over the wire and used for Procedure.
13:22:10 LIMA to LAD angiography performed.
13:22:49 Catheter exchanged over wire.
13:22:56 A MULTIPACK Pigtail 5 Fr catheter was advanced over
the wire and used for Procedure.
 
13:23:14 **ACC**Dominant side:Co-Dominant
13:24:25 LV gram done using MAYER
13:24:27 LV hemodynamics recorded.
13:24:48 Injector settings: Ml/sec: 5, Volume: 15,
13:24:58 EF : 60 %
13:25:50 Catheter removed.
13:26:33 Sheath removed intact; hemostasis achieved with
Exoseal to the Right Femoral artery.
13:27:34 Procedure ended.(Physican Out)
13:28:05 Fluoroscopy time 08.10 minutes.
13:28:13 Fluoroscopy dose: 502.19 mGy
13:28:13 Flurop Dose total: 502.19
13:28:58 Dose Area Product 29257 mGy/cm.
13:29:02 Contrast amount:Isovue 300 76ml.
13:29:20 Maximum allowable dose exceeded? No.
13:29:22 Sharps counted by scrub and verified by R.N.
13:30:44 Insertion/operative site no bleeding no hematoma.
13:30:54 Post-op/insertion site Right Femoral artery dressed
using a 4 x 4 and Tegaderm.
13:31:11 Post right femoral artery:stable, soft, clean and dry
13:31:15 Post Procedure Pulses reassessed and unchanged
13:31:23 Post procedure: right dorsailis pedis pulse 1+
Palpable, but thready & weak; easily obliterated.
13:31:26 Post-procedure physical assessment completed. ASA
score P 2 - A patient with mild systemic disease as
per Angelo Estevez MD.
13:31:29 Post procedure rhythm: unchanged.
13:31:32 Estimated blood loss: 5 ml
13:31:45 Post procedure instruction explained to
patient.Patient verbalizes understanding.
13:31:46 Patient needs reinforcement of post procedure
teaching.
13:32:46 Sheath removed intact; hemostasis achieved with Manual
Compression to the Right Femoral vein.
13:32:54 Post-op/insertion site Right Femoral vein dressed
using a 4 x 4 and Tegaderm.
13:33:01 Post right femoral vein:stable, soft, clean and dry
13:34:58 EXOSEAL 5Fr () opened to sterile field.
13:37:03 Procedure and supply charges have been captured,
reviewed, submitted and are correct.
13:37:07 Procedure Complication : No complications
13:37:10 Vital chart was stopped
13:37:13 [facility] Findings: mild to moderate CAD (<70%)
13:37:15 Operative report dictated upon procedure completion.
13:37:16 See physician's report for complete and final results.
13:37:19 Report given to Pre/Post Procedure Room.
13:37:22 Patient transfered to Pre/Post Procedure Room with
Stretcher.
13:37:25 Procedure ended.
13:37:25 Full Disclosure recording stopped
13:37:35 **ACC-PCI Only** Patient was given prescriptions, or
instructed by Angelo Estevez MD to start/continue the
following medications upon discharge: Aspirin
13:37:37 End room use (Document Last)
13:37:48 End room use (Document Last)
Device Usage
Item Name   Manufacture  Quantity  Catalog    Hospital Part    Current Minimal L
ot# /
Number     Charge   Number  Stock   Stock   Serial#
Code
 
ACIST       Acist        1         51357      610357   857972  050654  20
Syringe     Medical
(44299)     Systems Inc
Bag         Microtek     1               425059   39381   786478  5
Decanter    Medical Inc.
()
ACIST Hand  Acist        1         70939      816970   677180  785991  5
Control     Medical
(08426)     Systems Inc
ACIST       Acist        1         92799      725451   584601  293682  5
AllPlayers.com
(10452)     Systems Inc
Tegaderm 4  3M           1         1626W      624536   954394  252089  5
x 4 (1626W)
Medline     Medline      1         [id number]  813143   48420   317145  5
Cath Pack
([id number])
DIAGNOSTIC  Cardinal     1         DQ4057     109824   47436   730154  30
Multipack   Health
5Fr
catheter
set
([id number])
SHEATH 5FR  Terumo       2         JOC291     672472   974309  442697  5
[place]
(TKF582)
EMERALD     Cardinal     1         502-455    067386   593519  097606  5
Guide Wire  Health
(502455)
MULTIPACK   Cardinal     1                                     143063  5
JL 4.0 5Fr  Health
catheter
DIAGNOSTIC  Cardinal     1         105318I    359099   452180  923296  5
RCB 5Fr     Health
catheter
(179211S)
DIAGNOSTIC  Cardinal     1         683620M    879650   390605  126516  15
AR MOD 5Fr  Health
Catheter
(728095K)
DIAGNOSTIC  Cardinal     1         535859N    291200   568152  044878  5
IM 5Fr      Health
catheter
(094009N)
MULTIPACK   Cardinal     1                                     329794  5
Pigtail 5   Health
Fr catheter
EXOSEAL 5Fr Cardinal     1               813790   598280  834223  10
()     Health
Signature Audit Cable
Stage           Time        Signature      Unsigned
Intra-Procedure 2020   Fifi Vo
1:38:11 PM  RN
Intra-Procedure 2020   Aris Sarabia RN
1:39:09 PM
 
Intra-Procedure 2020   Angelo Estevez MD
1:39:41 PM
 
 
 
 
 
 
 
 
 
 
 
 
 
 
 
 
 
 
 
 
 
 
 
 
 
 
 
 
 
 
 
 
 
 
 
 
 
 
 
 
 
 
 
 
 
 
 
 
 
 
 
 
 
[address] [place] [address]

## 2020-02-25 NOTE — NUR
PT RESTING IN SUPINE POSTION, OBSERVED MOVING ALL EXTREMETIES FREELY. PT DENIES
NEEDS AT THIS TIME. FAMILY AT BEDSIDE, CALL LIGHT WITHIN REACH. WILL CONTINUE
TO MONITOR.

## 2020-02-25 NOTE — NUR
IV DC AND MONITORING DISCONTINUED. PT DRESSING WITH DTR'S ASSISTANCE.
R GROIN REMAINS CDI, NO S/S BLEEDING OR HEMATOMA.

## 2020-02-26 VITALS — DIASTOLIC BLOOD PRESSURE: 71 MMHG | SYSTOLIC BLOOD PRESSURE: 152 MMHG

## 2020-02-26 VITALS — DIASTOLIC BLOOD PRESSURE: 71 MMHG | SYSTOLIC BLOOD PRESSURE: 146 MMHG

## 2020-02-26 VITALS — DIASTOLIC BLOOD PRESSURE: 71 MMHG | SYSTOLIC BLOOD PRESSURE: 154 MMHG

## 2020-02-26 VITALS — SYSTOLIC BLOOD PRESSURE: 124 MMHG | DIASTOLIC BLOOD PRESSURE: 66 MMHG

## 2020-02-26 VITALS — DIASTOLIC BLOOD PRESSURE: 67 MMHG | SYSTOLIC BLOOD PRESSURE: 150 MMHG

## 2020-02-26 NOTE — NUR
ASSUMED CARE OF PT .  RESTING IN BED WITH EYES CLOSED, AROUSES EASILY DENIES CP
OR OTHER C/O.  AMB TO BR INDEPENDENTLY.  VSS

## 2020-02-26 NOTE — NUR
PATIENT CONCERNED ABOUT NOT TAKING MEDICATIONS TODAY. VS STABLE. INFORMED THAT
I CALLED ANDREA KAM ABOUT THAT AND THE DISCHARGE EARLIER.

## 2020-02-26 NOTE — NUR
PATIENT FAMILY ASKED RAN WAGNER RN IF THEY NEEDED TO GO GET HER HOME
MEDICATION. SHE STATED NO. THAT THE DOCTORS ARE ROUNDING NOW.

## 2020-02-26 NOTE — NUR
IV DC'ED FROM RIGHT FOREARM WITH TIP INTACT. PATIENT AND FAMILY VERBALIZED
UNDERSTANDING OF DISCHARGE INSTRUCTIONS THAT WERE GIVEN. WHEELED OUT BY PHILLIP.

## 2020-02-26 NOTE — NUR
PT AMBULATED TO RESTROOM AND BACK WITH STEADY GAIT. NO SIGNS DISTRESS NOTED.
WILL CONTINUE TO MONITOR.